# Patient Record
Sex: FEMALE | Race: WHITE | NOT HISPANIC OR LATINO | Employment: UNEMPLOYED | URBAN - METROPOLITAN AREA
[De-identification: names, ages, dates, MRNs, and addresses within clinical notes are randomized per-mention and may not be internally consistent; named-entity substitution may affect disease eponyms.]

---

## 2024-08-12 ENCOUNTER — HOSPITAL ENCOUNTER (OUTPATIENT)
Facility: HOSPITAL | Age: 54
Discharge: HOME | End: 2024-08-12
Attending: EMERGENCY MEDICINE | Admitting: EMERGENCY MEDICINE
Payer: MEDICAID

## 2024-08-12 VITALS
WEIGHT: 190 LBS | TEMPERATURE: 98.4 F | SYSTOLIC BLOOD PRESSURE: 137 MMHG | OXYGEN SATURATION: 97 % | BODY MASS INDEX: 31.65 KG/M2 | HEIGHT: 65 IN | HEART RATE: 85 BPM | RESPIRATION RATE: 16 BRPM | DIASTOLIC BLOOD PRESSURE: 68 MMHG

## 2024-08-12 DIAGNOSIS — L73.9 FOLLICULITIS: Primary | ICD-10-CM

## 2024-08-12 PROCEDURE — G0463 HOSPITAL OUTPT CLINIC VISIT: HCPCS | Performed by: PHYSICIAN ASSISTANT

## 2024-08-12 RX ORDER — CEPHALEXIN 500 MG/1
500 CAPSULE ORAL 2 TIMES DAILY
Qty: 20 CAPSULE | Refills: 0 | Status: SHIPPED | OUTPATIENT
Start: 2024-08-12 | End: 2024-08-22

## 2024-08-12 RX ORDER — SULFAMETHOXAZOLE AND TRIMETHOPRIM 800; 160 MG/1; MG/1
1 TABLET ORAL 2 TIMES DAILY
Qty: 20 TABLET | Refills: 0 | Status: SHIPPED | OUTPATIENT
Start: 2024-08-12 | End: 2024-08-22

## 2024-08-12 NOTE — DISCHARGE INSTRUCTIONS
Warm moist compresses as much as possible over the next 3 to 5 days.  Keep the area clean and dry with mild soap and water.  Take the antibiotics prescribed.  If the area should grow, become painful, fluctuant, or fail to improve return to the ER for incision and drainage.

## 2024-08-12 NOTE — [facility] PROVIDER NOTE
EMERGENCY DEPARTMENT ENCOUNTER    Room Number:  10/10  Date seen:  8/12/2024  Time seen: 18:17 EDT  PCP: Ambrosio Terrell MD  Historian: Patient    Discussed/obtained information from independent historians: N/A    HPI:  Chief complaint: Right axilla nodule  A complete HPI/ROS/PMH/PSH/SH/FH are unobtainable due to: Nothing  Context:Blanca Louise is a 54 y.o. female who presents to the ED with c/o right axilla nodule that is been ongoing for the past 4 to 5 days.  Patient reports the area is red and slightly raised.  It is tender to the touch but not painful.  No fever and chills.  No history of MRSA.  Patient denies IVDU's.  Patient is here for further evaluation.        Chronic or social conditions impacting care:    ALLERGIES  Patient has no known allergies.    PAST MEDICAL HISTORY  Active Ambulatory Problems     Diagnosis Date Noted    Chronic post-traumatic stress disorder (PTSD) 08/15/2019    Bipolar affective disorder, mixed 08/15/2019    OCD (obsessive compulsive disorder) 08/15/2019     Resolved Ambulatory Problems     Diagnosis Date Noted    No Resolved Ambulatory Problems     Past Medical History:   Diagnosis Date    Anxiety     Depression     Obsessive-compulsive disorder     Panic disorder        PAST SURGICAL HISTORY  Past Surgical History:   Procedure Laterality Date    BREAST BIOPSY      HYSTERECTOMY         FAMILY HISTORY  Family History   Problem Relation Age of Onset    Depression Father     Anxiety disorder Sister        SOCIAL HISTORY  Social History     Socioeconomic History    Marital status:    Tobacco Use    Smoking status: Every Day    Smokeless tobacco: Never   Substance and Sexual Activity    Alcohol use: No    Drug use: Yes     Types: Amphetamines     Comment: in the past     Sexual activity: Defer       REVIEW OF SYSTEMS  Review of Systems    All systems reviewed and negative except for those discussed in HPI.     PHYSICAL EXAM    I have reviewed the triage vital signs  and nursing notes.  Vitals:    08/12/24 1655   BP: 137/68   Pulse: 85   Resp: 16   Temp: 98.4 °F (36.9 °C)   SpO2: 97%     Physical Exam    GENERAL: not distressed  HENT: nares patent  EYES: no scleral icterus  NECK: no ROM limitations  CV: regular rhythm, regular rate  RESPIRATORY: normal effort  ABDOMEN: soft  : deferred  MUSCULOSKELETAL: no deformity  NEURO: alert, moves all extremities, follows commands  SKIN: Right axilla.  Small erythematous/indurated follicular nodule consistent with an early folliculitis/possible early abscess.    LAB RESULTS  No results found for this or any previous visit (from the past 24 hour(s)).    Ordered the above labs and independently interpreted results.  My findings will be discussed in the ED course or medical decision making section below    RADIOLOGY RESULTS  No Radiology Exams Resulted Within Past 24 Hours     Ordered the above noted radiological studies.  Independently interpreted by me.  My findings will be discussed in the medical decision section below.     PROGRESS, DATA ANALYSIS, CONSULTS AND MEDICAL DECISION MAKING    Please note that this section constitutes my independent interpretation of clinical data including lab results, radiology, EKG's.  This constitutes my independent professional opinion regarding differential diagnosis and management of this patient.  It may include any factors such as history from outside sources, review of external records, social determinants of health, management of medications, response to those treatments, and discussions with other providers.       Orders placed during this visit:  No orders of the defined types were placed in this encounter.    Procedures        Medical Decision Making    Abscess, folliculitis, lymph node, hidradenitis suppurativa.  This would appear to be a folliculitis at this stage and I cannot rule out early abscess.  Offered to use a local anesthetic to the area and probed with an 18-gauge needle and syringe  to rule out abscess.  Offered empiric I&D.  This was declined.  Patient would prefer to treat with antibiotics and warm moist compresses first.  She states she will return to the ED should her symptoms worsen and not resolve with warm compresses and antibiotics.  Will treat according to the patient wishes.      DIAGNOSIS  Final diagnoses:   Folliculitis right axilla          Medication List        New Prescriptions      cephalexin 500 MG capsule  Commonly known as: KEFLEX  Take 1 capsule by mouth 2 (Two) Times a Day for 10 days.     sulfamethoxazole-trimethoprim 800-160 MG per tablet  Commonly known as: BACTRIM DS,SEPTRA DS  Take 1 tablet by mouth 2 (Two) Times a Day for 10 days.               Where to Get Your Medications        These medications were sent to [id number] - [place], [place] - [address] - [phone]  - [phone]  [address] [place] [address]      Phone: [phone]   cephalexin 500 MG capsule  sulfamethoxazole-trimethoprim 800-160 MG per tablet         FOLLOW-UP  No follow-up provider specified.      Latest Documented Vital Signs:  As of 18:26 EDT  BP- 137/68 HR- 85 Temp- 98.4 °F (36.9 °C) (Oral) O2 sat- 97%    Appropriate PPE utilized throughout this patient encounter to include mask, if indicated, per current protocol. Hand hygiene was performed before donning PPE and after removal when leaving the room.    Please note that portions of this were completed with a voice recognition program.     Note Disclaimer: At [facility], we believe that sharing information builds trust and better relationships. You are receiving this note because you are receiving care at [facility] or recently visited. It is possible you will see health information before a provider has talked with you about it. This kind of information can be easy to misunderstand. To help you fully understand what it means for your health, we urge you to discuss this note with your  provider.